# Patient Record
Sex: MALE | Race: WHITE | Employment: OTHER | URBAN - METROPOLITAN AREA
[De-identification: names, ages, dates, MRNs, and addresses within clinical notes are randomized per-mention and may not be internally consistent; named-entity substitution may affect disease eponyms.]

---

## 2023-02-22 PROBLEM — L72.9 SCALP CYST: Status: ACTIVE | Noted: 2023-02-22

## 2023-02-22 PROBLEM — Z12.11 ENCOUNTER FOR SCREENING COLONOSCOPY: Status: ACTIVE | Noted: 2023-02-22

## 2023-02-22 PROBLEM — Z80.0 FAMILY HISTORY OF COLON CANCER: Status: ACTIVE | Noted: 2023-02-22

## 2023-03-24 PROBLEM — Z12.11 ENCOUNTER FOR SCREENING COLONOSCOPY: Status: RESOLVED | Noted: 2023-02-22 | Resolved: 2023-03-24

## 2024-09-30 ENCOUNTER — TELEPHONE (OUTPATIENT)
Dept: UROLOGY | Age: 69
End: 2024-09-30

## 2024-09-30 NOTE — TELEPHONE ENCOUNTER
Patient left a voicemail on Friday that he needed someone to call him right away because he needed something for pain. He said he was going to try answering service. Can someone please call and make sure he was taken [email or web address]

## 2024-10-01 PROBLEM — N20.1 URETERAL CALCULUS: Status: ACTIVE | Noted: 2024-10-01
